# Patient Record
Sex: MALE | Race: WHITE | Employment: FULL TIME | URBAN - METROPOLITAN AREA
[De-identification: names, ages, dates, MRNs, and addresses within clinical notes are randomized per-mention and may not be internally consistent; named-entity substitution may affect disease eponyms.]

---

## 2019-02-14 ENCOUNTER — HOSPITAL ENCOUNTER (EMERGENCY)
Age: 49
Discharge: HOME | End: 2019-02-14
Attending: EMERGENCY MEDICINE
Payer: COMMERCIAL

## 2019-02-14 ENCOUNTER — APPOINTMENT (OUTPATIENT)
Dept: CT IMAGING | Age: 49
End: 2019-02-14
Attending: EMERGENCY MEDICINE
Payer: COMMERCIAL

## 2019-02-14 VITALS
OXYGEN SATURATION: 97 % | DIASTOLIC BLOOD PRESSURE: 90 MMHG | BODY MASS INDEX: 42.23 KG/M2 | WEIGHT: 295 LBS | SYSTOLIC BLOOD PRESSURE: 173 MMHG | RESPIRATION RATE: 19 BRPM | HEART RATE: 105 BPM | HEIGHT: 70 IN | TEMPERATURE: 99 F

## 2019-02-14 DIAGNOSIS — R56.9 ALCOHOL WITHDRAWAL SEIZURE WITHOUT COMPLICATION (HCC): ICD-10-CM

## 2019-02-14 DIAGNOSIS — E11.65 UNCONTROLLED TYPE 2 DIABETES MELLITUS WITH HYPERGLYCEMIA (HCC): ICD-10-CM

## 2019-02-14 DIAGNOSIS — F10.930 ALCOHOL WITHDRAWAL SEIZURE WITHOUT COMPLICATION (HCC): ICD-10-CM

## 2019-02-14 DIAGNOSIS — N17.9 AKI (ACUTE KIDNEY INJURY) (HCC): ICD-10-CM

## 2019-02-14 DIAGNOSIS — G40.909 SEIZURE DISORDER (HCC): Primary | ICD-10-CM

## 2019-02-14 LAB
ALBUMIN SERPL-MCNC: 3.2 G/DL (ref 3.5–5)
ALBUMIN/GLOB SERPL: 0.8 {RATIO} (ref 1.1–2.2)
ALP SERPL-CCNC: 67 U/L (ref 45–117)
ALT SERPL-CCNC: 67 U/L (ref 12–78)
AMPHET UR QL SCN: NEGATIVE
ANION GAP SERPL CALC-SCNC: 11 MMOL/L (ref 5–15)
APPEARANCE UR: CLEAR
AST SERPL-CCNC: 82 U/L (ref 15–37)
BACTERIA URNS QL MICRO: NEGATIVE /HPF
BARBITURATES UR QL SCN: NEGATIVE
BASOPHILS # BLD: 0 K/UL (ref 0–0.1)
BASOPHILS NFR BLD: 1 % (ref 0–1)
BENZODIAZ UR QL: NEGATIVE
BILIRUB SERPL-MCNC: 1.2 MG/DL (ref 0.2–1)
BILIRUB UR QL: NEGATIVE
BUN SERPL-MCNC: 25 MG/DL (ref 6–20)
BUN/CREAT SERPL: 15 (ref 12–20)
CALCIUM SERPL-MCNC: 9.1 MG/DL (ref 8.5–10.1)
CANNABINOIDS UR QL SCN: NEGATIVE
CHLORIDE SERPL-SCNC: 103 MMOL/L (ref 97–108)
CO2 SERPL-SCNC: 22 MMOL/L (ref 21–32)
COCAINE UR QL SCN: NEGATIVE
COLOR UR: ABNORMAL
CREAT SERPL-MCNC: 1.62 MG/DL (ref 0.7–1.3)
DIFFERENTIAL METHOD BLD: ABNORMAL
DRUG SCRN COMMENT,DRGCM: NORMAL
EOSINOPHIL # BLD: 0.1 K/UL (ref 0–0.4)
EOSINOPHIL NFR BLD: 2 % (ref 0–7)
EPITH CASTS URNS QL MICRO: ABNORMAL /LPF
ERYTHROCYTE [DISTWIDTH] IN BLOOD BY AUTOMATED COUNT: 13.6 % (ref 11.5–14.5)
ETHANOL SERPL-MCNC: <10 MG/DL
GLOBULIN SER CALC-MCNC: 4.2 G/DL (ref 2–4)
GLUCOSE BLD STRIP.AUTO-MCNC: 189 MG/DL (ref 65–100)
GLUCOSE SERPL-MCNC: 184 MG/DL (ref 65–100)
GLUCOSE UR STRIP.AUTO-MCNC: 500 MG/DL
HCT VFR BLD AUTO: 31.1 % (ref 36.6–50.3)
HGB BLD-MCNC: 10.8 G/DL (ref 12.1–17)
HGB UR QL STRIP: NEGATIVE
HYALINE CASTS URNS QL MICRO: ABNORMAL /LPF (ref 0–5)
IMM GRANULOCYTES # BLD AUTO: 0 K/UL (ref 0–0.04)
IMM GRANULOCYTES NFR BLD AUTO: 0 % (ref 0–0.5)
KETONES UR QL STRIP.AUTO: NEGATIVE MG/DL
LEUKOCYTE ESTERASE UR QL STRIP.AUTO: NEGATIVE
LYMPHOCYTES # BLD: 1.4 K/UL (ref 0.8–3.5)
LYMPHOCYTES NFR BLD: 23 % (ref 12–49)
MCH RBC QN AUTO: 33.6 PG (ref 26–34)
MCHC RBC AUTO-ENTMCNC: 34.7 G/DL (ref 30–36.5)
MCV RBC AUTO: 96.9 FL (ref 80–99)
METHADONE UR QL: NEGATIVE
MONOCYTES # BLD: 0.6 K/UL (ref 0–1)
MONOCYTES NFR BLD: 9 % (ref 5–13)
NEUTS SEG # BLD: 4.1 K/UL (ref 1.8–8)
NEUTS SEG NFR BLD: 66 % (ref 32–75)
NITRITE UR QL STRIP.AUTO: NEGATIVE
NRBC # BLD: 0 K/UL (ref 0–0.01)
NRBC BLD-RTO: 0 PER 100 WBC
OPIATES UR QL: NEGATIVE
PCP UR QL: NEGATIVE
PH UR STRIP: 5.5 [PH] (ref 5–8)
PLATELET # BLD AUTO: 152 K/UL (ref 150–400)
PMV BLD AUTO: 10.3 FL (ref 8.9–12.9)
POTASSIUM SERPL-SCNC: 4.4 MMOL/L (ref 3.5–5.1)
PROT SERPL-MCNC: 7.4 G/DL (ref 6.4–8.2)
PROT UR STRIP-MCNC: 30 MG/DL
RBC # BLD AUTO: 3.21 M/UL (ref 4.1–5.7)
RBC #/AREA URNS HPF: ABNORMAL /HPF (ref 0–5)
SERVICE CMNT-IMP: ABNORMAL
SODIUM SERPL-SCNC: 136 MMOL/L (ref 136–145)
SP GR UR REFRACTOMETRY: 1.02 (ref 1–1.03)
UA: UC IF INDICATED,UAUC: ABNORMAL
UROBILINOGEN UR QL STRIP.AUTO: 0.2 EU/DL (ref 0.2–1)
WBC # BLD AUTO: 6.2 K/UL (ref 4.1–11.1)
WBC URNS QL MICRO: ABNORMAL /HPF (ref 0–4)

## 2019-02-14 PROCEDURE — 74011250636 HC RX REV CODE- 250/636: Performed by: EMERGENCY MEDICINE

## 2019-02-14 PROCEDURE — 80053 COMPREHEN METABOLIC PANEL: CPT

## 2019-02-14 PROCEDURE — 80307 DRUG TEST PRSMV CHEM ANLYZR: CPT

## 2019-02-14 PROCEDURE — 81001 URINALYSIS AUTO W/SCOPE: CPT

## 2019-02-14 PROCEDURE — 96360 HYDRATION IV INFUSION INIT: CPT

## 2019-02-14 PROCEDURE — 85025 COMPLETE CBC W/AUTO DIFF WBC: CPT

## 2019-02-14 PROCEDURE — 36415 COLL VENOUS BLD VENIPUNCTURE: CPT

## 2019-02-14 PROCEDURE — 93005 ELECTROCARDIOGRAM TRACING: CPT

## 2019-02-14 PROCEDURE — 70450 CT HEAD/BRAIN W/O DYE: CPT

## 2019-02-14 PROCEDURE — 82962 GLUCOSE BLOOD TEST: CPT

## 2019-02-14 PROCEDURE — 99285 EMERGENCY DEPT VISIT HI MDM: CPT

## 2019-02-14 RX ADMIN — SODIUM CHLORIDE 1000 ML: 900 INJECTION, SOLUTION INTRAVENOUS at 17:30

## 2019-02-14 NOTE — ED PROVIDER NOTES
EMERGENCY DEPARTMENT HISTORY AND PHYSICAL EXAM 
 
 
Date: 2/14/2019 Patient Name: Missy Hernandez History of Presenting Illness Chief Complaint Patient presents with  Seizure EMS reports full body seizure for 1 minute[place]  Pt reports vision changes prior to incident. No hx of seizures. History Provided By: Patient and EMS 
 
HPI: Missy Hernandez, 44 y.o. male with PMHx significant for HTN and DM, presents via EMS to the ED with cc of one episode of full body seizure pta. Per EMS, pt had a tonic clonic seizure that lasted 1 minute in length. They note that the pt was acting normally upon their arrival and that he had a blood sugar level of 281. Pt states that he felt the seizure coming on and states that he had difficulty breathing and everything went \"foggy\". He notes that he does not remember the episode. Pt states that he drinks 2-3 shots of bourbon per day and notes that he gets shakes when he has not had any EtOH for extended periods of time. Pt denies having hit his head. He denies having had any surgeries. Pt denies a hx of seizures. He denies a family hx of seizures. Pt states that he has NKDA. Additionally, pt specifically denies any recent fever, chills, headache, nausea, vomiting, diarrhea, abdominal pain, CP, SOB, lightheadedness, dizziness, numbness, weakness, tingling, BLE swelling, heart palpitations, urinary sxs, changes in BM, changes in PO intake, melena, hematochezia, cough, or congestion. PCP: Sharlene, MD Diane 
 
PMHx: Significant for HTN, DM, alcohol abuse PSHx: Significant for denies Social Hx: chewable tobacco, 2-3 bourbon drinks/day,  Denies Illicit Drugs There are no other complaints, changes or physical findings at this time. Past History Past Medical History: 
Significant for HTN, DM, alcohol abuse Past Surgical History: 
Denies Family History: 
Denies Social History: Social Hx: chewable tobacco, 2-3 bourbon drinks/day,  Denies Illicit Drugs Allergies: 
No Known Allergies Medications: No current facility-administered medications on file prior to encounter. No current outpatient medications on file prior to encounter. Review of Systems Review of Systems Constitutional: Negative for chills, fever and unexpected weight change. HENT: Negative for congestion, postnasal drip, rhinorrhea, sore throat, trouble swallowing and voice change. Eyes: Negative for visual disturbance. Respiratory: Negative for cough, chest tightness and shortness of breath. Cardiovascular: Negative for chest pain and leg swelling. Gastrointestinal: Negative for abdominal pain, diarrhea, nausea and vomiting. Genitourinary: Negative for dysuria, hematuria and urgency. Musculoskeletal: Negative for arthralgias and myalgias. Skin: Negative for rash. Neurological: Positive for tremors and seizures. Negative for dizziness, syncope, light-headedness and headaches. Psychiatric/Behavioral: Alcohol Abuse Physical Exam  
Physical Exam  
Constitutional: He is oriented to person, place, and time. He appears well-developed and well-nourished. No distress. HENT:  
Head: Normocephalic and atraumatic. Mouth/Throat: Oropharynx is clear and moist.  
Ecchymosis to the L. tongue Eyes: Conjunctivae and EOM are normal. Pupils are equal, round, and reactive to light. Neck: Normal range of motion. Cardiovascular: Normal rate, regular rhythm and normal heart sounds. Exam reveals no gallop and no friction rub. No murmur heard. Pulmonary/Chest: Effort normal and breath sounds normal. No respiratory distress. He has no wheezes. He has no rales. He exhibits no tenderness. Abdominal: Soft. Bowel sounds are normal. There is no tenderness. There is no rebound and no guarding. Musculoskeletal: Normal range of motion. He exhibits no edema, tenderness or deformity. Tremulous in upper extremities Neurological: He is alert and oriented to person, place, and time. Skin: Skin is warm and dry. No rash noted. Psychiatric: He has a normal mood and affect. Diagnostic Study Results Labs - Recent Results (from the past 24 hour(s)) EKG, 12 LEAD, INITIAL Collection Time: 02/14/19  5:13 PM  
Result Value Ref Range Ventricular Rate 102 BPM  
 Atrial Rate 102 BPM  
 P-R Interval 224 ms QRS Duration 70 ms Q-T Interval 450 ms QTC Calculation (Bezet) 586 ms Calculated P Axis 52 degrees Calculated R Axis 12 degrees Calculated T Axis 23 degrees Diagnosis Sinus tachycardia with 1st degree AV block with fusion complexes Septal infarct , age undetermined No previous ECGs available CBC WITH AUTOMATED DIFF Collection Time: 02/14/19  5:24 PM  
Result Value Ref Range WBC 6.2 4.1 - 11.1 K/uL  
 RBC 3.21 (L) 4.10 - 5.70 M/uL  
 HGB 10.8 (L) 12.1 - 17.0 g/dL HCT 31.1 (L) 36.6 - 50.3 % MCV 96.9 80.0 - 99.0 FL  
 MCH 33.6 26.0 - 34.0 PG  
 MCHC 34.7 30.0 - 36.5 g/dL  
 RDW 13.6 11.5 - 14.5 % PLATELET 741 143 - 908 K/uL MPV 10.3 8.9 - 12.9 FL  
 NRBC 0.0 0  WBC ABSOLUTE NRBC 0.00 0.00 - 0.01 K/uL NEUTROPHILS 66 32 - 75 % LYMPHOCYTES 23 12 - 49 % MONOCYTES 9 5 - 13 % EOSINOPHILS 2 0 - 7 % BASOPHILS 1 0 - 1 % IMMATURE GRANULOCYTES 0 0.0 - 0.5 % ABS. NEUTROPHILS 4.1 1.8 - 8.0 K/UL  
 ABS. LYMPHOCYTES 1.4 0.8 - 3.5 K/UL  
 ABS. MONOCYTES 0.6 0.0 - 1.0 K/UL  
 ABS. EOSINOPHILS 0.1 0.0 - 0.4 K/UL  
 ABS. BASOPHILS 0.0 0.0 - 0.1 K/UL  
 ABS. IMM. GRANS. 0.0 0.00 - 0.04 K/UL  
 DF AUTOMATED METABOLIC PANEL, COMPREHENSIVE Collection Time: 02/14/19  5:24 PM  
Result Value Ref Range Sodium 136 136 - 145 mmol/L Potassium 4.4 3.5 - 5.1 mmol/L Chloride 103 97 - 108 mmol/L  
 CO2 22 21 - 32 mmol/L Anion gap 11 5 - 15 mmol/L Glucose 184 (H) 65 - 100 mg/dL  BUN 25 (H) 6 - 20 MG/DL  
 Creatinine 1.62 (H) 0.70 - 1.30 MG/DL  
 BUN/Creatinine ratio 15 12 - 20 GFR est AA 55 (L) >60 ml/min/1.73m2 GFR est non-AA 45 (L) >60 ml/min/1.73m2 Calcium 9.1 8.5 - 10.1 MG/DL Bilirubin, total 1.2 (H) 0.2 - 1.0 MG/DL  
 ALT (SGPT) 67 12 - 78 U/L  
 AST (SGOT) 82 (H) 15 - 37 U/L Alk. phosphatase 67 45 - 117 U/L Protein, total 7.4 6.4 - 8.2 g/dL Albumin 3.2 (L) 3.5 - 5.0 g/dL Globulin 4.2 (H) 2.0 - 4.0 g/dL A-G Ratio 0.8 (L) 1.1 - 2.2 ETHYL ALCOHOL Collection Time: 02/14/19  5:24 PM  
Result Value Ref Range ALCOHOL(ETHYL),SERUM <10 <10 MG/DL  
GLUCOSE, POC Collection Time: 02/14/19  5:24 PM  
Result Value Ref Range Glucose (POC) 189 (H) 65 - 100 mg/dL Performed by [facility] URINALYSIS W/ REFLEX CULTURE Collection Time: 02/14/19  6:36 PM  
Result Value Ref Range Color YELLOW/STRAW Appearance CLEAR CLEAR Specific gravity 1.022 1.003 - 1.030    
 pH (UA) 5.5 5.0 - 8.0 Protein 30 (A) NEG mg/dL Glucose 500 (A) NEG mg/dL Ketone NEGATIVE  NEG mg/dL Bilirubin NEGATIVE  NEG Blood NEGATIVE  NEG Urobilinogen 0.2 0.2 - 1.0 EU/dL Nitrites NEGATIVE  NEG Leukocyte Esterase NEGATIVE  NEG    
 WBC 0-4 0 - 4 /hpf  
 RBC 0-5 0 - 5 /hpf Epithelial cells FEW FEW /lpf Bacteria NEGATIVE  NEG /hpf  
 UA:UC IF INDICATED CULTURE NOT INDICATED BY UA RESULT CNI Hyaline cast 0-2 0 - 5 /lpf DRUG SCREEN, URINE Collection Time: 02/14/19  6:36 PM  
Result Value Ref Range AMPHETAMINES NEGATIVE  NEG    
 BARBITURATES NEGATIVE  NEG BENZODIAZEPINES NEGATIVE  NEG    
 COCAINE NEGATIVE  NEG METHADONE NEGATIVE  NEG    
 OPIATES NEGATIVE  NEG    
 PCP(PHENCYCLIDINE) NEGATIVE  NEG    
 THC (TH-CANNABINOL) NEGATIVE  NEG Drug screen comment (NOTE) Radiologic Studies -  
CT HEAD WO CONT Final Result IMPRESSION: Normal CT scan of the head without contrast  
  
  
  
  
 
CT Results  (Last 48 hours) 02/14/19 1802  CT HEAD WO CONT Final result Impression:  IMPRESSION: Normal CT scan of the head without contrast  
   
   
   
  
 Narrative:  EXAM: CT HEAD WO CONT INDICATION: new onset seizure COMPARISON: None. CONTRAST: None. TECHNIQUE: Unenhanced CT of the head was performed using 5 mm images. Brain and  
bone windows were generated. CT dose reduction was achieved through use of a  
standardized protocol tailored for this examination and automatic exposure  
control for dose modulation. FINDINGS:  
The ventricles and sulci are normal in size, shape and configuration and  
midline. There is no significant white matter disease. There is no intracranial  
hemorrhage, extra-axial collection, mass, mass effect or midline shift. The  
basilar cisterns are open. No acute infarct is identified. The bone windows  
demonstrate no abnormalities. The visualized portions of the paranasal sinuses  
and mastoid air cells are clear. Medical Decision Making I am the first provider for this patient. I reviewed the vital signs, available nursing notes, past medical history, past surgical history, family history and social history. Vital Signs-Reviewed the patient's vital signs. Patient Vitals for the past 24 hrs: 
 Temp Pulse Resp BP SpO2  
02/14/19 1832  (!) 105 19 173/90 97 % 02/14/19 1745  (!) 102 17 149/83 98 % 02/14/19 1730  (!) 101 21 (!) 134/98 98 % 02/14/19 1715  (!) 102 22 156/73 98 % 02/14/19 1712 99 °F (37.2 °C) (!) 103 24 (!) 139/109 97 % Pulse Oximetry Analysis - 97% on RA Cardiac Monitor:  
Rate: 103 bpm 
Rhythm: Normal Sinus Rhythm ED EKG interpretation: 1713 Rhythm: sinus tachycardia with 1st degree AV block with fusion complexes; and regular . Rate (approx.): 102 bpm; Axis: normal; P wave: normal; QRS interval: normal ; ST/T wave: normal; Other findings: septal infarct.  This EKG was interpreted by Marcia Collet, M.D. 
 
 Records Reviewed: Nursing Notes, Old Medical Records, Previous electrocardiograms, Previous Radiology Studies and Previous Laboratory Studies Provider Notes (Medical Decision Making):  
Patient is a 52yo male with new onset seizure. Patient is alert and oriented now with no focal deficits, does have ecchymosis to the L. Tongue. Does have significant alcohol abuse history with tremors upon not drinking for periods of time. Concern for brain lesion vs alcohol withdraw vs new onset seizure. Will get basic labs, UDS, CT head. ED Course:  
Initial assessment performed. The patients presenting problems have been discussed, and they are in agreement with the care plan formulated and outlined with them. I have encouraged them to ask questions as they arise throughout their visit. 19:20 Labs and CT unremarkable. This is likely alcohol withdraw seizures. Patient has no further seizure activity while in the ED. CIWA of 6. Will discharge patient home at this time and have patient follow up with his PCP in the next week. Patient is agreeable with plan. TOBACCO COUNSELING:  
Upon evaluation, pt expressed that they are a current tobacco user. For approximately 10 minutes, pt has been counseled on the dangers of smoking and was encouraged to quit as soon as possible in order to decrease further risks to their health. Pt has conveyed their understanding of the risks involved should they continue to use tobacco products. ALCOHOL/SUBSTANCE ABUSE COUNSELING:  
Upon evaluation, pt endorsed recent alcohol/illicit drug use. For approximately 15 minutes, pt has been counseled on the dangers of alcohol and illicit drug use on their health, and they were encouraged to quit as soon as possible in order to decrease further risks to their health. Pt has conveyed their understanding of the risks involved should they continue to use these products.  
 
HYPERTENSION COUNSELING 
 Education was provided to the patient today regarding their hypertension. Patient is made aware of their elevated blood pressure and is instructed to follow up this week with their Primary Care for a recheck. Patient is counseled regarding consequences of chronic, uncontrolled hypertension including kidney disease, heart disease, stroke or even death. Patient states their understanding and agrees to follow up this week. Additionally, during their visit, I discussed sodium restriction, maintaining ideal body weight and regular exercise program as physiologic means to achieve blood pressure control. The patient will strive towards this. I reviewed our electronic medical record system for any past medical records that were available that may contribute to the patient's current condition, the nursing notes and vital signs from today's visit. Marie Mora MD 
Medications Administered During ED Course: 
Medications  
sodium chloride 0.9 % bolus infusion 1,000 mL (0 mL IntraVENous IV Completed 2/14/19 1830) Progress Note: 
7:10 PM 
Patient has been reassessed and reports feeling better and symptoms have improved after ED treatment. Additionally, pt is able to tolerate PO and ambulate per baseline. Vanesa Garrison final labs and imaging have been reviewed with him. He has been counseled regarding his diagnosis. He verbally conveys understanding and agreement of the signs, symptoms, diagnosis, treatment and prognosis and additionally agrees to follow up as recommended with Diane Flores MD in 24 - 48 hours. He also agrees with the care-plan and conveys that all of his questions have been answered. I have also put together some discharge instructions for him that include: 1) educational information regarding their diagnosis, 2) how to care for their diagnosis at home, as well a 3) list of reasons why they would want to return to the ED prior to their follow-up appointment, should their condition change. I have answered all questions to patient's satisfaction. He both understood and agreed with plan as discussed above. Vital signs stable for discharge. Critical Care Time:  
0 minutes. Disposition: 
DISCHARGE NOTE: 
7:24 PM 
The patient is ready for discharge. The patients signs, symptoms, diagnosis, and instructions for discharge have been discussed and the pt has conveyed their understanding. The patient is to follow up as recommended with PCP or return to the ER should their symptoms worsen. Plan has been discussed and patient has conveyed their agreement. PLAN: 
1. Discharge home. There are no discharge medications for this patient. No current facility-administered medications for this encounter. No current outpatient medications on file. 2.  
Follow-up Information Follow up With Specialties Details Why Contact [facility] EMERGENCY DEPT Emergency Medicine In 1 day As needed, If symptoms worsen [address] [facility] 
[phone] Diane Su MD  In 1 week for seizure follow up Patient can only remember the practice name and not the physician Return to ED if worse Diagnosis Clinical Impression: 1. Seizure disorder ([address] 2. Alcohol withdrawal seizure without complication ([address] 3. SASHA (acute kidney injury) ([address] 4. Uncontrolled type 2 diabetes mellitus with hyperglycemia ([address] Attestations This note is prepared by Milvia Ervin, acting as Scribe for MD Melisa Pollock MD: The scribe's documentation has been prepared under my direction and personally reviewed by me in its entirety. I confirm that the note above accurately reflects all work, treatment, procedures, and medical decision making performed by me. 
 
: 
This note will not be viewable in [address] [place]

## 2019-02-14 NOTE — ED NOTES
Assisted pt to stand at bedside to void 400cc. Urine specimens sent. Pt tolerated well, HR increased to 110 while standing. Eating ice chips for mouth comfort and using phone. Remains on EKG monitor. No seizures at this time.

## 2019-02-15 LAB
ATRIAL RATE: 102 BPM
CALCULATED P AXIS, ECG09: 52 DEGREES
CALCULATED R AXIS, ECG10: 12 DEGREES
CALCULATED T AXIS, ECG11: 23 DEGREES
DIAGNOSIS, 93000: NORMAL
P-R INTERVAL, ECG05: 224 MS
Q-T INTERVAL, ECG07: 450 MS
QRS DURATION, ECG06: 70 MS
QTC CALCULATION (BEZET), ECG08: 586 MS
VENTRICULAR RATE, ECG03: 102 BPM

## 2019-02-15 NOTE — ED NOTES
Bedside shift change report given to Jennifer Alston RN (oncoming nurse) by Terrance Perez (offgoing nurse). Report included the following information ED Summary and MAR.

## 2019-02-15 NOTE — DISCHARGE INSTRUCTIONS
Patient Education        Seizure: Care Instructions  Your Care Instructions    Seizures are caused by abnormal patterns of electrical signals in the brain. They are different for each person. Seizures can affect movement, speech, vision, or awareness. Some people have only slight shaking of a hand and do not pass out. Other people may pass out and have violent shaking of the whole body. Some people appear to stare into space. They are awake, but they can't respond normally. Later, they may not remember what happened. You may need tests to identify the type and cause of the seizures. A seizure may occur only once, or you may have them more than one time. Taking medicines as directed and following up with your doctor may help keep you from having more seizures. The doctor has checked you carefully, but problems can develop later. If you notice any problems or new symptoms, get medical treatment right away. Follow-up care is a key part of your treatment and safety. Be sure to make and go to all appointments, and call your doctor if you are having problems. It's also a good idea to know your test results and keep a list of the medicines you take. How can you care for yourself at home? · Be safe with medicines. Take your medicines exactly as prescribed. Call your doctor if you think you are having a problem with your medicine. · Do not do any activity that could be dangerous to you or others until your doctor says it is safe to do so. For example, do not drive a car, operate machinery, swim, or climb ladders. · Be sure that anyone treating you for any health problem knows that you have had a seizure and what medicines you are taking for it. · Identify and avoid things that may make you more likely to have a seizure. These may include lack of sleep, alcohol or drug use, stress, or not eating. · Make sure you go to your follow-up appointment. When should you call for help?   Call 911 anytime you think you may need emergency care. For example, call if:    · You have another seizure.     · You have more than one seizure in 24 hours.     · You have new symptoms, such as trouble walking, speaking, or thinking clearly.    Call your doctor now or seek immediate medical care if:    · You are not acting normally.    Watch closely for changes in your health, and be sure to contact your doctor if you have any problems. Where can you learn more? Go to http://peter-nina.info/. Enter F994 in the search box to learn more about \"Seizure: Care Instructions. \"  Current as of: Deandra 3, 2018  Content Version: 11.9  © 7775-5774 [employer]. Care instructions adapted under license by [employer] (which disclaims liability or warranty for this information). If you have questions about a medical condition or this instruction, always ask your healthcare professional. [address] any warranty or liability for your use of this information.

## 2019-02-15 NOTE — ED NOTES
MD Anoop Alejandre reviewed discharge instructions with the patient. The patient verbalized understanding. All questions and concerns were addressed. The patient declined a wheelchair and is discharged ambulatory  with instructions and prescriptions in hand. Pt is alert and oriented x 4. Respirations are clear and unlabored.